# Patient Record
Sex: FEMALE | Race: WHITE | URBAN - METROPOLITAN AREA
[De-identification: names, ages, dates, MRNs, and addresses within clinical notes are randomized per-mention and may not be internally consistent; named-entity substitution may affect disease eponyms.]

---

## 2019-01-05 ENCOUNTER — OFFICE VISIT (OUTPATIENT)
Dept: PRIMARY CARE CLINIC | Age: 49
End: 2019-01-05

## 2019-01-05 VITALS
TEMPERATURE: 97.9 F | OXYGEN SATURATION: 98 % | HEART RATE: 73 BPM | HEIGHT: 59 IN | RESPIRATION RATE: 18 BRPM | SYSTOLIC BLOOD PRESSURE: 131 MMHG | WEIGHT: 116.6 LBS | DIASTOLIC BLOOD PRESSURE: 80 MMHG | BODY MASS INDEX: 23.51 KG/M2

## 2019-01-05 DIAGNOSIS — R30.0 DYSURIA: ICD-10-CM

## 2019-01-05 DIAGNOSIS — N30.01 ACUTE CYSTITIS WITH HEMATURIA: Primary | ICD-10-CM

## 2019-01-05 LAB
BILIRUB UR QL STRIP: NEGATIVE
GLUCOSE UR-MCNC: NEGATIVE MG/DL
KETONES P FAST UR STRIP-MCNC: NEGATIVE MG/DL
PH UR STRIP: 6 [PH] (ref 4.6–8)
PROT UR QL STRIP: NEGATIVE
SP GR UR STRIP: 1.01 (ref 1–1.03)
UA UROBILINOGEN AMB POC: NORMAL (ref 0.2–1)
URINALYSIS CLARITY POC: NORMAL
URINALYSIS COLOR POC: NORMAL
URINE BLOOD POC: NEGATIVE
URINE LEUKOCYTES POC: NORMAL
URINE NITRITES POC: NEGATIVE

## 2019-01-05 RX ORDER — KETOCONAZOLE 20 MG/G
CREAM TOPICAL
Refills: 0 | COMMUNITY
Start: 2018-12-05 | End: 2019-01-05 | Stop reason: ALTCHOICE

## 2019-01-05 RX ORDER — BUPROPION HYDROCHLORIDE 150 MG/1
TABLET ORAL
Refills: 1 | COMMUNITY
Start: 2018-12-07

## 2019-01-05 RX ORDER — MOMETASONE FUROATE 1 MG/G
CREAM TOPICAL
Refills: 0 | COMMUNITY
Start: 2018-11-02 | End: 2019-01-05 | Stop reason: ALTCHOICE

## 2019-01-05 RX ORDER — SULFAMETHOXAZOLE AND TRIMETHOPRIM 800; 160 MG/1; MG/1
1 TABLET ORAL 2 TIMES DAILY
Qty: 10 TAB | Refills: 0 | Status: SHIPPED | OUTPATIENT
Start: 2019-01-05 | End: 2019-01-10

## 2019-01-05 RX ORDER — FLUCONAZOLE 200 MG/1
TABLET ORAL
Refills: 0 | COMMUNITY
Start: 2018-12-07 | End: 2019-01-05 | Stop reason: ALTCHOICE

## 2019-01-05 RX ORDER — KETOCONAZOLE 20 MG/ML
SHAMPOO TOPICAL
Refills: 0 | COMMUNITY
Start: 2018-12-07 | End: 2019-01-05 | Stop reason: ALTCHOICE

## 2019-01-05 RX ORDER — NITROFURANTOIN 25; 75 MG/1; MG/1
CAPSULE ORAL
Refills: 0 | COMMUNITY
Start: 2018-12-11 | End: 2019-01-05 | Stop reason: ALTCHOICE

## 2019-01-05 NOTE — PROGRESS NOTES
Subjective:     Melissa Nassar is a 50 y.o. female who complains of dysuria, frequency, urgency for 3 days. Patient denies flank pain, vomiting, fever, unusual vaginal discharge. Patient does have a history of recurrent UTI. Patient does not have a history of pyelonephritis. She was treated with Macrobid a few weeks ago, and in November she was treated for UTI and can not remember the name of the medication. She has a follow up scheduled with her GYN. Patient Active Problem List   Diagnosis Code    S/P dilatation of esophageal stricture Z98.890, Z87.19    Depression with anxiety F41.8    Chronic tension headaches G44.229     Patient Active Problem List    Diagnosis Date Noted    S/P dilatation of esophageal stricture 02/21/2010    Depression with anxiety 02/21/2010    Chronic tension headaches 02/21/2010     Current Outpatient Medications   Medication Sig Dispense Refill    buPROPion XL (WELLBUTRIN XL) 150 mg tablet TAKE 1 TABLET ORALLY DAILY  1    trimethoprim-sulfamethoxazole (BACTRIM DS, SEPTRA DS) 160-800 mg per tablet Take 1 Tab by mouth two (2) times a day for 5 days. 10 Tab 0     No Known Allergies  History reviewed. No pertinent past medical history. Past Surgical History:   Procedure Laterality Date    BREAST SURGERY PROCEDURE UNLISTED      breast implants    HX HEENT      nose surgery     History reviewed. No pertinent family history. Social History     Tobacco Use    Smoking status: Never Smoker    Smokeless tobacco: Never Used   Substance Use Topics    Alcohol use: Yes     Comment: occasional        Review of Systems  Pertinent items are noted in HPI.     Objective:     Visit Vitals  /80 (BP 1 Location: Left arm, BP Patient Position: Sitting)   Pulse 73   Temp 97.9 °F (36.6 °C) (Oral)   Resp 18   Ht 4' 11\" (1.499 m)   Wt 116 lb 9.6 oz (52.9 kg)   SpO2 98%   BMI 23.55 kg/m²     General:  alert, cooperative, no distress   Abdomen: soft, nontender, nondistended, no masses or organomegaly. Back:  CVA tenderness absent   :  defer exam     Laboratory:   Urine dipstick shows positive for RBC's and positive for leukocytes. Micro exam: not done. Sent to lab    Assessment/Plan:     Acute cystitis     1. TMP/SMX  2. Maintain adequate hydration  3. May use OTC pyridium as desired, which will turn urine orange/red color  4. Follow up if symptoms not improving, and prn. ICD-10-CM ICD-9-CM    1. Acute cystitis with hematuria N30.01 595.0 CULTURE, URINE      trimethoprim-sulfamethoxazole (BACTRIM DS, SEPTRA DS) 160-800 mg per tablet   2. Dysuria R30.0 788.1 AMB POC URINALYSIS DIP STICK AUTO W/O MICRO   .

## 2019-01-05 NOTE — PROGRESS NOTES
Pt c/o recurrent abdominal pressure and discomfort after voiding, pt states she has has recurrent uti's since having her mirena place in December, pt denies taking anything otc for current symptoms. This note will not be viewable in [address]

## 2019-01-05 NOTE — PATIENT INSTRUCTIONS
Painful Urination (Dysuria): Care Instructions  Your Care Instructions  Burning pain with urination (dysuria) is a common symptom of a urinary tract infection or other urinary problems. The bladder may become inflamed. This can cause pain when the bladder fills and empties. You may also feel pain if the tube that carries urine from the bladder to the outside of the body (urethra) gets irritated or infected. Sexually transmitted infections (STIs) also may cause pain when you urinate. Sometimes the pain can be caused by things other than an infection. The urethra can be irritated by soaps, perfumes, or foreign objects in the urethra. Kidney stones can cause pain when they pass through the urethra. The cause may be hard to find. You may need tests. Treatment for painful urination depends on the cause. Follow-up care is a key part of your treatment and safety. Be sure to make and go to all appointments, and call your doctor if you are having problems. It's also a good idea to know your test results and keep a list of the medicines you take. How can you care for yourself at home? · Drink extra water for the next day or two. This will help make the urine less concentrated. (If you have kidney, heart, or liver disease and have to limit fluids, talk with your doctor before you increase the amount of fluids you drink.)  · Avoid drinks that are carbonated or have caffeine. They can irritate the bladder. · Urinate often. Try to empty your bladder each time. For women:  · Urinate right after you have sex. · After going to the bathroom, wipe from front to back. · Avoid douches, bubble baths, and feminine hygiene sprays. And avoid other feminine hygiene products that have deodorants. When should you call for help? Call your doctor now or seek immediate medical care if:    · You have new symptoms, such as fever, nausea, or vomiting.     · You have new or worse symptoms of a urinary problem. For example:  ?  You have blood or pus in your urine. ? You have chills or body aches. ? It hurts worse to urinate. ? You have groin or belly pain. ? You have pain in your back just below your rib cage (the flank area).    Watch closely for changes in your health, and be sure to contact your doctor if you have any problems. Where can you learn more? Go to http://meg-robert.info/. Enter E715 in the search box to learn more about \"Painful Urination (Dysuria): Care Instructions. \"  Current as of: March 21, 2018  Content Version: 11.8  © 5252-8731 [employer]. Care instructions adapted under license by [employer] (which disclaims liability or warranty for this information). If you have questions about a medical condition or this instruction, always ask your healthcare professional. [address] any warranty or liability for your use of this information.

## 2019-01-08 LAB — BACTERIA UR CULT: NORMAL

## 2019-01-08 NOTE — PROGRESS NOTES
Please contact the patient and inform her that her urine culture was negative, she can stop taking the Bactrim and if her symptoms continue she should contact her PCP or follow up with urologist.  Marissa Crandall

## 2019-01-09 NOTE — PROGRESS NOTES
Called and spoke with patient, verified patients name and date of birth, advised that urine culture was negative, advised to stop taking bactrim and if symptoms continued to follow up with pcp or urologist, pt verbalized understanding of all information and had no further questions at this time

## 2023-03-10 ENCOUNTER — OFFICE VISIT (OUTPATIENT)
Dept: PRIMARY CARE CLINIC | Age: 53
End: 2023-03-10

## 2023-03-10 VITALS
HEIGHT: 59 IN | BODY MASS INDEX: 22.98 KG/M2 | SYSTOLIC BLOOD PRESSURE: 170 MMHG | OXYGEN SATURATION: 97 % | WEIGHT: 114 LBS | DIASTOLIC BLOOD PRESSURE: 92 MMHG | TEMPERATURE: 98.2 F | RESPIRATION RATE: 16 BRPM | HEART RATE: 87 BPM

## 2023-03-10 DIAGNOSIS — G44.219 EPISODIC TENSION-TYPE HEADACHE, NOT INTRACTABLE: Primary | ICD-10-CM

## 2023-03-10 DIAGNOSIS — R03.0 ELEVATED BLOOD PRESSURE READING: ICD-10-CM

## 2023-03-10 RX ORDER — METHOCARBAMOL 750 MG/1
750 TABLET, FILM COATED ORAL
Qty: 14 TABLET | Refills: 0 | Status: SHIPPED | OUTPATIENT
Start: 2023-03-10 | End: 2023-03-24

## 2023-03-10 RX ORDER — SPIRONOLACTONE 100 MG/1
TABLET, FILM COATED ORAL
COMMUNITY

## 2023-03-10 RX ORDER — OMEPRAZOLE 10 MG/1
CAPSULE, DELAYED RELEASE ORAL
COMMUNITY
Start: 2018-12-11

## 2023-03-10 RX ORDER — LEVONORGESTREL 52 MG/1
INTRAUTERINE DEVICE INTRAUTERINE
COMMUNITY

## 2023-03-10 NOTE — PROGRESS NOTES
Identified pt with two pt identifiers(name and ). Reviewed record in preparation for visit and have obtained necessary documentation. Chief Complaint   Patient presents with    Nausea    Headache     Started this morning. Diarrhea        Vitals:    03/10/23 1346   BP: (!) 170/92   Pulse: 87   Resp: 16   Temp: 98.2 °F (36.8 °C)   TempSrc: Temporal   SpO2: 97%   Weight: 114 lb (51.7 kg)   Height: 4' 11\" (1.499 m)   PainSc:   0 - No pain       Health Maintenance Due   Topic    COVID-19 Vaccine (1)    Depression Monitoring     DTaP/Tdap/Td series (1 - Tdap)    Cervical cancer screen     Lipid Screen     Colorectal Cancer Screening Combo     Shingles Vaccine (1 of 2)    Breast Cancer Screen Mammogram     Flu Vaccine (1)       Coordination of Care Questionnaire:  :   1) Have you been to an emergency room, urgent care, or hospitalized since your last visit? If yes, where when, and reason for visit? no       2. Have seen or consulted any other health care provider since your last visit? If yes, where when, and reason for visit? NO      Patient is accompanied by self I have received verbal consent from Dennis Failing to discuss any/all medical information while they are present in the room. An electronic signature was used to authenticate this note.   -- Elias Roman LPN

## 2023-03-10 NOTE — PROGRESS NOTES
Chief Complaint   Patient presents with    Nausea    Headache     Started this morning. Diarrhea     HISTORY OF PRESENT ILLNESS  Alexis Cook is a 48 y.o. female. Onset of headache for past few weeks. Worse by end of the week. Describes headache as band across forehead and through top of the head, light hurts, and worse in the morning. Using OTC tylenol and advil prn with little relief. Rates headache 6/10 currently. 8 or 9 of 10 in the mornings. Rest helps. She reports recent teeth grinding dx and dentist treating her with mouth guard. She uses during her 40 minute to and from work every day. 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 She notes very loud. Associated loose BM this morning. Denies blood . Hx of GERD, takes Prilosec daily. She denies fever, chills, or body aches. Takes generic allergy pill, denies upper resp symptoms. No hx of HTN. She has appointment with PCP 3/28. Review of Systems   Constitutional: Negative. HENT:  Positive for congestion. Respiratory: Negative. Cardiovascular: Negative. Gastrointestinal:  Positive for diarrhea. Negative for abdominal pain, heartburn, nausea and vomiting. Genitourinary: Negative. Musculoskeletal: Negative. Neurological:  Positive for dizziness and headaches. Negative for sensory change, speech change, focal weakness and weakness. Physical Exam  Vitals and nursing note reviewed. HENT:      Right Ear: Tympanic membrane normal.      Left Ear: Tympanic membrane normal.      Mouth/Throat:      Pharynx: Oropharynx is clear. Comments: No jaw clicking or tenderness  Cardiovascular:      Rate and Rhythm: Normal rate and regular rhythm. Pulmonary:      Effort: Pulmonary effort is normal. No respiratory distress. Breath sounds: Normal breath sounds. Musculoskeletal:      Cervical back: Full passive range of motion without pain and neck supple. No rigidity. Lymphadenopathy:      Cervical: No cervical adenopathy. Neurological:      Mental Status: She is alert. GCS: GCS eye subscore is 4. GCS verbal subscore is 5. GCS motor subscore is 6. Cranial Nerves: Cranial nerves 2-12 are intact. Sensory: Sensation is intact. Motor: Motor function is intact. Coordination: Coordination is intact. Gait: Gait is intact. History reviewed. No pertinent past medical history. Past Surgical History:   Procedure Laterality Date    HX HEENT      nose surgery    IN UNLISTED PROCEDURE BREAST      breast implants     BP (!) 170/92 (BP 1 Location: Left arm, BP Patient Position: Sitting, BP Cuff Size: Adult)   Pulse 87   Temp 98.2 °F (36.8 °C) (Temporal)   Resp 16   Ht 4' 11\" (1.499 m)   Wt 114 lb (51.7 kg)   SpO2 97%   BMI 23.03 kg/m²    ASSESSMENT and PLAN  1. Episodic tension-type headache, not intractable  -     methocarbamoL (ROBAXIN) 750 mg tablet; Take 1 Tablet by mouth nightly as needed for Muscle Spasm(s) for up to 14 days. , Normal, Disp-14 Tablet, R-0  2. Elevated blood pressure reading  Discussed with patient most likely tension headache. Associated bruxism and stress. DDX also includes HTN or medication side effect. Advised patient to follow up with PCP for BP check and management of tension headache. Tyrone /80. She may come to clinic next week for BP check. Discussed red flag symptoms needing emergency evaluation, go to ED.   BRODY Case

## 2024-03-10 ENCOUNTER — HOSPITAL ENCOUNTER (EMERGENCY)
Facility: HOSPITAL | Age: 54
Discharge: HOME | End: 2024-03-10
Attending: STUDENT IN AN ORGANIZED HEALTH CARE EDUCATION/TRAINING PROGRAM
Payer: COMMERCIAL

## 2024-03-10 VITALS
HEIGHT: 58 IN | WEIGHT: 113.32 LBS | TEMPERATURE: 99 F | OXYGEN SATURATION: 100 % | RESPIRATION RATE: 15 BRPM | SYSTOLIC BLOOD PRESSURE: 113 MMHG | HEART RATE: 75 BPM | DIASTOLIC BLOOD PRESSURE: 74 MMHG | BODY MASS INDEX: 23.79 KG/M2
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
## 2024-03-10 DIAGNOSIS — T18.128A ESOPHAGEAL OBSTRUCTION DUE TO FOOD IMPACTION: Primary | ICD-10-CM

## 2024-03-10 PROCEDURE — 6360000002 HC RX W HCPCS: Performed by: STUDENT IN AN ORGANIZED HEALTH CARE EDUCATION/TRAINING PROGRAM

## 2024-03-10 PROCEDURE — 88305 TISSUE EXAM BY PATHOLOGIST: CPT

## 2024-03-10 PROCEDURE — 96374 THER/PROPH/DIAG INJ IV PUSH: CPT

## 2024-03-10 PROCEDURE — 96375 TX/PRO/DX INJ NEW DRUG ADDON: CPT

## 2024-03-10 PROCEDURE — 99284 EMERGENCY DEPT VISIT MOD MDM: CPT

## 2024-03-10 PROCEDURE — C9113 INJ PANTOPRAZOLE SODIUM, VIA: HCPCS | Performed by: STUDENT IN AN ORGANIZED HEALTH CARE EDUCATION/TRAINING PROGRAM

## 2024-03-10 PROCEDURE — 6360000002 HC RX W HCPCS: Performed by: INTERNAL MEDICINE

## 2024-03-10 PROCEDURE — 3600007512: Performed by: INTERNAL MEDICINE

## 2024-03-10 PROCEDURE — 96376 TX/PRO/DX INJ SAME DRUG ADON: CPT

## 2024-03-10 PROCEDURE — 2720000010 HC SURG SUPPLY STERILE: Performed by: INTERNAL MEDICINE

## 2024-03-10 PROCEDURE — 3600007502: Performed by: INTERNAL MEDICINE

## 2024-03-10 PROCEDURE — 2580000003 HC RX 258: Performed by: STUDENT IN AN ORGANIZED HEALTH CARE EDUCATION/TRAINING PROGRAM

## 2024-03-10 PROCEDURE — A4216 STERILE WATER/SALINE, 10 ML: HCPCS | Performed by: STUDENT IN AN ORGANIZED HEALTH CARE EDUCATION/TRAINING PROGRAM

## 2024-03-10 PROCEDURE — 2709999900 HC NON-CHARGEABLE SUPPLY: Performed by: INTERNAL MEDICINE

## 2024-03-10 PROCEDURE — 7100000010 HC PHASE II RECOVERY - FIRST 15 MIN: Performed by: INTERNAL MEDICINE

## 2024-03-10 RX ORDER — FENTANYL CITRATE 50 UG/ML
50 INJECTION, SOLUTION INTRAMUSCULAR; INTRAVENOUS ONCE
Status: COMPLETED | OUTPATIENT
Start: 2024-03-10 | End: 2024-03-10

## 2024-03-10 RX ORDER — SODIUM CHLORIDE 0.9 % (FLUSH) 0.9 %
5-40 SYRINGE (ML) INJECTION EVERY 12 HOURS SCHEDULED
Status: DISCONTINUED | OUTPATIENT
Start: 2024-03-10 | End: 2024-03-10 | Stop reason: HOSPADM

## 2024-03-10 RX ORDER — MIDAZOLAM HYDROCHLORIDE 1 MG/ML
5 INJECTION, SOLUTION INTRAMUSCULAR; INTRAVENOUS ONCE
Status: COMPLETED | OUTPATIENT
Start: 2024-03-10 | End: 2024-03-10

## 2024-03-10 RX ORDER — KETOROLAC TROMETHAMINE 30 MG/ML
15 INJECTION, SOLUTION INTRAMUSCULAR; INTRAVENOUS
Status: COMPLETED | OUTPATIENT
Start: 2024-03-10 | End: 2024-03-10

## 2024-03-10 RX ORDER — SODIUM CHLORIDE 9 MG/ML
INJECTION, SOLUTION INTRAVENOUS CONTINUOUS
Status: DISCONTINUED | OUTPATIENT
Start: 2024-03-10 | End: 2024-03-10 | Stop reason: HOSPADM

## 2024-03-10 RX ORDER — GLUCAGON 1 MG/ML
1 KIT INJECTION ONCE
Status: COMPLETED | OUTPATIENT
Start: 2024-03-10 | End: 2024-03-10

## 2024-03-10 RX ORDER — SODIUM CHLORIDE 0.9 % (FLUSH) 0.9 %
5-40 SYRINGE (ML) INJECTION PRN
Status: DISCONTINUED | OUTPATIENT
Start: 2024-03-10 | End: 2024-03-10 | Stop reason: HOSPADM

## 2024-03-10 RX ORDER — SODIUM CHLORIDE 9 MG/ML
25 INJECTION, SOLUTION INTRAVENOUS PRN
Status: DISCONTINUED | OUTPATIENT
Start: 2024-03-10 | End: 2024-03-10 | Stop reason: HOSPADM

## 2024-03-10 RX ORDER — MIDAZOLAM HYDROCHLORIDE 1 MG/ML
2 INJECTION, SOLUTION INTRAMUSCULAR; INTRAVENOUS ONCE
Status: COMPLETED | OUTPATIENT
Start: 2024-03-10 | End: 2024-03-10

## 2024-03-10 RX ORDER — LIDOCAINE HYDROCHLORIDE 10 MG/ML
0.1 INJECTION, SOLUTION EPIDURAL; INFILTRATION; INTRACAUDAL; PERINEURAL ONCE
Status: CANCELLED | OUTPATIENT
Start: 2024-03-10 | End: 2024-03-10

## 2024-03-10 RX ORDER — GLUCAGON 1 MG/ML
2 KIT INJECTION ONCE
Status: COMPLETED | OUTPATIENT
Start: 2024-03-10 | End: 2024-03-10

## 2024-03-10 RX ORDER — SODIUM CHLORIDE, SODIUM LACTATE, POTASSIUM CHLORIDE, CALCIUM CHLORIDE 600; 310; 30; 20 MG/100ML; MG/100ML; MG/100ML; MG/100ML
INJECTION, SOLUTION INTRAVENOUS CONTINUOUS
Status: CANCELLED | OUTPATIENT
Start: 2024-03-10

## 2024-03-10 RX ADMIN — FENTANYL CITRATE 50 MCG: 50 INJECTION INTRAMUSCULAR; INTRAVENOUS at 10:02

## 2024-03-10 RX ADMIN — GLUCAGON 2 MG: 1 INJECTION, POWDER, LYOPHILIZED, FOR SOLUTION INTRAMUSCULAR; INTRAVENOUS at 03:33

## 2024-03-10 RX ADMIN — MIDAZOLAM 2 MG: 1 INJECTION INTRAMUSCULAR; INTRAVENOUS at 10:07

## 2024-03-10 RX ADMIN — GLUCAGON 1 MG: 1 INJECTION, POWDER, LYOPHILIZED, FOR SOLUTION INTRAMUSCULAR; INTRAVENOUS at 01:47

## 2024-03-10 RX ADMIN — MIDAZOLAM 2 MG: 1 INJECTION INTRAMUSCULAR; INTRAVENOUS at 10:01

## 2024-03-10 RX ADMIN — KETOROLAC TROMETHAMINE 15 MG: 30 INJECTION, SOLUTION INTRAMUSCULAR; INTRAVENOUS at 01:46

## 2024-03-10 RX ADMIN — SODIUM CHLORIDE 40 MG: 9 INJECTION INTRAMUSCULAR; INTRAVENOUS; SUBCUTANEOUS at 01:48

## 2024-03-10 ASSESSMENT — PAIN SCALES - GENERAL
PAINLEVEL_OUTOF10: 0
PAINLEVEL_OUTOF10: 0
PAINLEVEL_OUTOF10: 1

## 2024-03-10 ASSESSMENT — PAIN - FUNCTIONAL ASSESSMENT: PAIN_FUNCTIONAL_ASSESSMENT: 0-10

## 2024-03-10 ASSESSMENT — LIFESTYLE VARIABLES
HOW MANY STANDARD DRINKS CONTAINING ALCOHOL DO YOU HAVE ON A TYPICAL DAY: PATIENT DOES NOT DRINK
HOW OFTEN DO YOU HAVE A DRINK CONTAINING ALCOHOL: MONTHLY OR LESS

## 2024-03-10 NOTE — H&P
[facility]    Gastroenterology Outpatient History and Physical      Patient:  Kathrin Fernando  Physician: Eriberto Patino M.D.  Vital Signs:   Allergies:     Chief Complaint: Esophageal food bolus impaction - ate meat last night. It is still stuck.    History of Present Illness: As above, procedure being done in Emergency department    History:  As above    Medications: no blood thinners, daily PPi  Physical Exam:     General: well developed, well nourished   HEENT: unremarkable   Heart: regular rhythm no mumur    Lungs: clear   Abdominal:  benign   Neurological: unremarkable   Extremities: no edema     Findings/Diagnosis: Esophageal food bolus impaction  Plan of Care/Planned Procedure: EGD extraction of food bolus, possible biopsy possible dilation  Signed:  ARNULFO Patino MD

## 2024-03-10 NOTE — ED NOTES
Pt discharged by DARRON Mauro. Discharge instructions discussed and pt given opportunity to ask questions. Pt ambulatory out of ED with

## 2024-03-10 NOTE — ED NOTES
Assumed care of pt at this time. Pt resting comfortably in ED stretcher and on monitor x2. Pt updated on plan of care at this time. Pt has no complaints. Bed locked in low position. Call bell within reach.     0830: Pt resting in ED stretcher and remains on monitor x3 with call bell in reach.     0930: Pt has no complaints and resting comfortably at this time. Pt remains on monitor x3 with call bell in reach.     1045: Pt resting comfrotably. Aox4 post procedure. Pt remains on monitor x3 with call bell in reach. Pt  at bedside.

## 2024-03-10 NOTE — ED PROVIDER NOTES
[facility] EMERGENCY DEPT  EMERGENCY DEPARTMENT ENCOUNTER       Pt Name: Kathrin Fernando  MRN: [id number]  Birthdate 1970  Date of evaluation: 3/10/2024  Provider: Deven Santana MD   PCP: Connie Blum MD  Note Started: 5:50 AM 3/10/24     CHIEF COMPLAINT       Chief Complaint   Patient presents with    Food Bolus     Pt ambulatory into triage. States she had a meat roast for dinner at 5pm and it is stuck in throat. Airway patent, maintaining secretions, speaking in full sentences, 100% RA, respirations even and unlabored. Reports she has a stricture and usually gets her throat dilated every 2-3 years and hasn't yet. Denies pain but is uncomfortable.         HISTORY OF PRESENT ILLNESS: 1 or more elements      History From: patient  None     Kathrin Fernando is a 54 y.o. female who presents to the emergency department with chest discomfort and sensation of food stuck in her chest.  Patient states she had been brews for dinner around 5 PM, feels that a piece of meat roast became stuck in her esophagus.  Patient states she has had esophageal stricture requiring dilation in the past, frequently feels transient sensations of food becoming stuck which usually resolves after drinking liquids.  Patient states she attempted to drink an entire carbonated beverage before presenting to the ER.  Reports regurgitation of any attempted p.o.     Nursing Notes were all reviewed and agreed with or any disagreements were addressed in the HPI.     REVIEW OF SYSTEMS      Review of Systems     Positives and Pertinent negatives as per HPI.    PAST HISTORY     Past Medical History:  No past medical history on file.      Past Surgical History:  Past Surgical History:   Procedure Laterality Date    BREAST SURGERY      breast implants    HEENT      nose surgery       Family History:  No family history on file.    Social History:  Social History     Tobacco Use    Smoking status: Never    Smokeless tobacco: Never   Substance Use Topics

## 2024-03-10 NOTE — ED NOTES
Bedside shift change report given to Dagoberto RN (oncoming nurse) by DARRON Becker (offgoing nurse). Report included the following information Nurse Handoff Report, ED Encounter Summary, ED SBAR, Adult Overview, MAR, Recent Results, and Med Rec Status.

## 2024-03-10 NOTE — PROCEDURES
[facility] (Emergency Room)    Endoscopic Gastroduodenoscopy Procedure Note    NAME:  Kathrin Fernando   :   1970   MRN:   [id number]     Indication: Removal of foreign body     : Eriberto Patino MD    Referring Provider:  Connie Blum MD    Anesthesia/Sedation:  Versed 4 mg IV and Fentanyl 50 mcg IV     Procedure Details     After infomed consent was obtained for the procedure, with all risks and benefits of procedure explained the patient was taken to the endoscopy suite and placed in the left lateral decubitus position.  Following sequential administration of sedation as per above, the endoscope was inserted into the mouth and advanced under direct vision to second portion of the duodenum.  A careful inspection was made as the gastroscope was withdrawn, including a retroflexed view of the proximal stomach; findings and interventions are described below.      Findings:   Esophagus: A large piece of meat was stuck in the distal esophagus. I broke it up with a snare, pulled some of it out the mouth and pushed the rest of it into the stomach. There was a benign appearing stricture located at the GE junction. This was biopsied and balloon dilated up to 15 mm  There was mucosal ititation of the distal esophagus consistent with esophatitis.  Stomach: normal   Duodenum/jejunum: normal      Therapies:  removal of foreign body  using a snare  Biopsy  Balloon dilation to 15 mm    Specimens:  esophagus stricture           Complications:   None; patient tolerated the procedure well.           Impression:    -Grade 3 reflux esophagitis.    Recommendations:  -Continue acid suppression but increase dose to bid  - Mechanical soft diet for a few days.    Eriberto Patino MD

## 2024-03-10 NOTE — ED NOTES
0055: Assumed care of patient at this time. Patient attempted drinking a pepsi, jumping and landing on her heels with no success. Patient states that the food bolus is not lodged in her throat that it is in her chest. It has passed her esophageal stricture per patient. Patient was ambulatory to the room, AxO 4, GCS 15 with call bell in reach and placed on monitor x 2.    0129: MD Santana bedside at this time    0323: MD Max serrano at this time    0330: GI consult called at this time    0415: MD Max serrano at this time

## 2024-03-10 NOTE — PROGRESS NOTES
The risks and benefits of the bite block have been explained to patient.  Patient verbalizes understanding.

## 2024-03-10 NOTE — PROGRESS NOTES
CRE balloon dilatation of the esophagus   12 mm Balloon inflated to 3 ATMs and held for 5 seconds.  13.5 mm Balloon inflated to 4.5 ATMs and held for 5 seconds.  15 mm Balloon inflated to 8 ATMs and held for 15 seconds.    No subcutaneous crepitus of the chest or cervical region was noted post dilatation.

## (undated) DEVICE — SNARE ENDOSCP POLYP MED 2.4 MM 240 CM 27 MM 2.8 MM SHT SENS

## (undated) DEVICE — SET GRAV CK VLV NEEDLESS ST 3 GANGED 4WAY STPCOCK HI FLO 10

## (undated) DEVICE — IV START KIT: Brand: MEDLINE

## (undated) DEVICE — SYRINGE INFL 60ML DISP ALLIANCE II

## (undated) DEVICE — CATHETER IV 20GA L1.16IN OD1.0414-1.1176MM ID0.762-0.8382MM

## (undated) DEVICE — ESOPHAGEAL BALLOON DILATATION CATHETER: Brand: CRE FIXED WIRE

## (undated) DEVICE — SYSTEM REPROC CBL 3 LD DISPOSABLE

## (undated) DEVICE — CATHETER IV 18GA L1.16IN OD1.27-1.3462MM ID0.9398-1.016MM

## (undated) DEVICE — CATHETER IV 24GA L0.75IN OD0.6604-0.7366MM

## (undated) DEVICE — CATHETER IV 22GA L1IN OD0.8382-0.9144MM ID0.6096-0.6858MM 382523

## (undated) DEVICE — FORCEP BX LG CAP 2.4 MMX120 CM W/ NDL YEL RADIAL JAW 4 DISP

## (undated) DEVICE — RETRIEVAL DEVICE: Brand: RESCUENET™